# Patient Record
Sex: MALE | Race: WHITE | NOT HISPANIC OR LATINO | URBAN - METROPOLITAN AREA
[De-identification: names, ages, dates, MRNs, and addresses within clinical notes are randomized per-mention and may not be internally consistent; named-entity substitution may affect disease eponyms.]

---

## 2017-06-20 PROBLEM — Z00.00 ENCOUNTER FOR PREVENTIVE HEALTH EXAMINATION: Status: ACTIVE | Noted: 2017-06-20

## 2018-03-15 ENCOUNTER — INPATIENT (INPATIENT)
Facility: HOSPITAL | Age: 33
LOS: 1 days | Discharge: HOME | End: 2018-03-17
Attending: SURGERY | Admitting: SURGERY
Payer: COMMERCIAL

## 2018-03-15 VITALS
DIASTOLIC BLOOD PRESSURE: 62 MMHG | TEMPERATURE: 98 F | SYSTOLIC BLOOD PRESSURE: 129 MMHG | RESPIRATION RATE: 16 BRPM | OXYGEN SATURATION: 100 % | HEART RATE: 73 BPM

## 2018-03-15 DIAGNOSIS — K81.9 CHOLECYSTITIS, UNSPECIFIED: ICD-10-CM

## 2018-03-15 LAB
ALBUMIN SERPL ELPH-MCNC: 4.7 G/DL — SIGNIFICANT CHANGE UP (ref 3.3–5)
ALP SERPL-CCNC: 75 U/L — SIGNIFICANT CHANGE UP (ref 40–120)
ALT FLD-CCNC: 17 U/L — SIGNIFICANT CHANGE UP (ref 4–41)
APPEARANCE UR: SIGNIFICANT CHANGE UP
APTT BLD: 21.9 SEC — LOW (ref 27.5–37.4)
AST SERPL-CCNC: 19 U/L — SIGNIFICANT CHANGE UP (ref 4–40)
BASE EXCESS BLDV CALC-SCNC: -2.9 MMOL/L — SIGNIFICANT CHANGE UP
BASE EXCESS BLDV CALC-SCNC: 0.1 MMOL/L — SIGNIFICANT CHANGE UP
BASOPHILS # BLD AUTO: 0.05 K/UL — SIGNIFICANT CHANGE UP (ref 0–0.2)
BASOPHILS NFR BLD AUTO: 0.5 % — SIGNIFICANT CHANGE UP (ref 0–2)
BILIRUB SERPL-MCNC: 0.4 MG/DL — SIGNIFICANT CHANGE UP (ref 0.2–1.2)
BILIRUB UR-MCNC: NEGATIVE — SIGNIFICANT CHANGE UP
BLD GP AB SCN SERPL QL: NEGATIVE — SIGNIFICANT CHANGE UP
BLOOD GAS VENOUS - CREATININE: 0.78 MG/DL — SIGNIFICANT CHANGE UP (ref 0.5–1.3)
BLOOD GAS VENOUS - CREATININE: 0.82 MG/DL — SIGNIFICANT CHANGE UP (ref 0.5–1.3)
BLOOD UR QL VISUAL: HIGH
BUN SERPL-MCNC: 19 MG/DL — SIGNIFICANT CHANGE UP (ref 7–23)
CALCIUM SERPL-MCNC: 9.6 MG/DL — SIGNIFICANT CHANGE UP (ref 8.4–10.5)
CHLORIDE BLDV-SCNC: 109 MMOL/L — HIGH (ref 96–108)
CHLORIDE BLDV-SCNC: 111 MMOL/L — HIGH (ref 96–108)
CHLORIDE SERPL-SCNC: 103 MMOL/L — SIGNIFICANT CHANGE UP (ref 98–107)
CO2 SERPL-SCNC: 19 MMOL/L — LOW (ref 22–31)
COLOR SPEC: YELLOW — SIGNIFICANT CHANGE UP
CREAT SERPL-MCNC: 0.9 MG/DL — SIGNIFICANT CHANGE UP (ref 0.5–1.3)
EOSINOPHIL # BLD AUTO: 0.16 K/UL — SIGNIFICANT CHANGE UP (ref 0–0.5)
EOSINOPHIL NFR BLD AUTO: 1.6 % — SIGNIFICANT CHANGE UP (ref 0–6)
GAS PNL BLDV: 136 MMOL/L — SIGNIFICANT CHANGE UP (ref 136–146)
GAS PNL BLDV: 137 MMOL/L — SIGNIFICANT CHANGE UP (ref 136–146)
GLUCOSE BLDV-MCNC: 104 — HIGH (ref 70–99)
GLUCOSE BLDV-MCNC: 144 — HIGH (ref 70–99)
GLUCOSE SERPL-MCNC: 136 MG/DL — HIGH (ref 70–99)
GLUCOSE UR-MCNC: NEGATIVE — SIGNIFICANT CHANGE UP
HCO3 BLDV-SCNC: 22 MMOL/L — SIGNIFICANT CHANGE UP (ref 20–27)
HCO3 BLDV-SCNC: 25 MMOL/L — SIGNIFICANT CHANGE UP (ref 20–27)
HCT VFR BLD CALC: 41.9 % — SIGNIFICANT CHANGE UP (ref 39–50)
HCT VFR BLDV CALC: 40.2 % — SIGNIFICANT CHANGE UP (ref 39–51)
HCT VFR BLDV CALC: 45.2 % — SIGNIFICANT CHANGE UP (ref 39–51)
HGB BLD-MCNC: 14.8 G/DL — SIGNIFICANT CHANGE UP (ref 13–17)
HGB BLDV-MCNC: 13.1 G/DL — SIGNIFICANT CHANGE UP (ref 13–17)
HGB BLDV-MCNC: 14.8 G/DL — SIGNIFICANT CHANGE UP (ref 13–17)
IMM GRANULOCYTES # BLD AUTO: 0.04 # — SIGNIFICANT CHANGE UP
IMM GRANULOCYTES NFR BLD AUTO: 0.4 % — SIGNIFICANT CHANGE UP (ref 0–1.5)
INR BLD: 1.33 — HIGH (ref 0.88–1.17)
KETONES UR-MCNC: SIGNIFICANT CHANGE UP
LACTATE BLDV-MCNC: 1.4 MMOL/L — SIGNIFICANT CHANGE UP (ref 0.5–2)
LACTATE BLDV-MCNC: 4.1 MMOL/L — CRITICAL HIGH (ref 0.5–2)
LEUKOCYTE ESTERASE UR-ACNC: NEGATIVE — SIGNIFICANT CHANGE UP
LIDOCAIN IGE QN: 27 U/L — SIGNIFICANT CHANGE UP (ref 7–60)
LYMPHOCYTES # BLD AUTO: 2.19 K/UL — SIGNIFICANT CHANGE UP (ref 1–3.3)
LYMPHOCYTES # BLD AUTO: 21.9 % — SIGNIFICANT CHANGE UP (ref 13–44)
MCHC RBC-ENTMCNC: 31.1 PG — SIGNIFICANT CHANGE UP (ref 27–34)
MCHC RBC-ENTMCNC: 35.3 % — SIGNIFICANT CHANGE UP (ref 32–36)
MCV RBC AUTO: 88 FL — SIGNIFICANT CHANGE UP (ref 80–100)
MONOCYTES # BLD AUTO: 0.84 K/UL — SIGNIFICANT CHANGE UP (ref 0–0.9)
MONOCYTES NFR BLD AUTO: 8.4 % — SIGNIFICANT CHANGE UP (ref 2–14)
MUCOUS THREADS # UR AUTO: SIGNIFICANT CHANGE UP
NEUTROPHILS # BLD AUTO: 6.74 K/UL — SIGNIFICANT CHANGE UP (ref 1.8–7.4)
NEUTROPHILS NFR BLD AUTO: 67.2 % — SIGNIFICANT CHANGE UP (ref 43–77)
NITRITE UR-MCNC: NEGATIVE — SIGNIFICANT CHANGE UP
NRBC # FLD: 0 — SIGNIFICANT CHANGE UP
PCO2 BLDV: 25 MMHG — LOW (ref 41–51)
PCO2 BLDV: 40 MMHG — LOW (ref 41–51)
PH BLDV: 7.36 PH — SIGNIFICANT CHANGE UP (ref 7.32–7.43)
PH BLDV: 7.55 PH — HIGH (ref 7.32–7.43)
PH UR: 6 — SIGNIFICANT CHANGE UP (ref 4.6–8)
PLATELET # BLD AUTO: 247 K/UL — SIGNIFICANT CHANGE UP (ref 150–400)
PMV BLD: 9.8 FL — SIGNIFICANT CHANGE UP (ref 7–13)
PO2 BLDV: 107 MMHG — HIGH (ref 35–40)
PO2 BLDV: 28 MMHG — LOW (ref 35–40)
POTASSIUM BLDV-SCNC: 3.3 MMOL/L — LOW (ref 3.4–4.5)
POTASSIUM BLDV-SCNC: 3.4 MMOL/L — SIGNIFICANT CHANGE UP (ref 3.4–4.5)
POTASSIUM SERPL-MCNC: 3.7 MMOL/L — SIGNIFICANT CHANGE UP (ref 3.5–5.3)
POTASSIUM SERPL-SCNC: 3.7 MMOL/L — SIGNIFICANT CHANGE UP (ref 3.5–5.3)
PROT SERPL-MCNC: 7.3 G/DL — SIGNIFICANT CHANGE UP (ref 6–8.3)
PROT UR-MCNC: 30 MG/DL — HIGH
PROTHROM AB SERPL-ACNC: 15.4 SEC — HIGH (ref 9.8–13.1)
RBC # BLD: 4.76 M/UL — SIGNIFICANT CHANGE UP (ref 4.2–5.8)
RBC # FLD: 11.9 % — SIGNIFICANT CHANGE UP (ref 10.3–14.5)
RBC CASTS # UR COMP ASSIST: >50 — HIGH (ref 0–?)
RH IG SCN BLD-IMP: POSITIVE — SIGNIFICANT CHANGE UP
RH IG SCN BLD-IMP: POSITIVE — SIGNIFICANT CHANGE UP
SAO2 % BLDV: 61.5 % — SIGNIFICANT CHANGE UP (ref 60–85)
SAO2 % BLDV: 97.9 % — HIGH (ref 60–85)
SODIUM SERPL-SCNC: 142 MMOL/L — SIGNIFICANT CHANGE UP (ref 135–145)
SP GR SPEC: 1.04 — SIGNIFICANT CHANGE UP (ref 1–1.04)
UROBILINOGEN FLD QL: NORMAL MG/DL — SIGNIFICANT CHANGE UP
WBC # BLD: 10.02 K/UL — SIGNIFICANT CHANGE UP (ref 3.8–10.5)
WBC # FLD AUTO: 10.02 K/UL — SIGNIFICANT CHANGE UP (ref 3.8–10.5)
WBC UR QL: SIGNIFICANT CHANGE UP (ref 0–?)

## 2018-03-15 PROCEDURE — 74177 CT ABD & PELVIS W/CONTRAST: CPT | Mod: 26

## 2018-03-15 PROCEDURE — 99221 1ST HOSP IP/OBS SF/LOW 40: CPT | Mod: 57

## 2018-03-15 PROCEDURE — 76705 ECHO EXAM OF ABDOMEN: CPT | Mod: 26

## 2018-03-15 RX ORDER — SODIUM CHLORIDE 9 MG/ML
1000 INJECTION INTRAMUSCULAR; INTRAVENOUS; SUBCUTANEOUS ONCE
Qty: 0 | Refills: 0 | Status: COMPLETED | OUTPATIENT
Start: 2018-03-15 | End: 2018-03-15

## 2018-03-15 RX ORDER — CEFOTETAN DISODIUM 1 G
1 VIAL (EA) INJECTION ONCE
Qty: 0 | Refills: 0 | Status: COMPLETED | OUTPATIENT
Start: 2018-03-15 | End: 2018-03-15

## 2018-03-15 RX ORDER — CEFOTETAN DISODIUM 1 G
VIAL (EA) INJECTION
Qty: 0 | Refills: 0 | Status: DISCONTINUED | OUTPATIENT
Start: 2018-03-15 | End: 2018-03-16

## 2018-03-15 RX ORDER — ONDANSETRON 8 MG/1
4 TABLET, FILM COATED ORAL ONCE
Qty: 0 | Refills: 0 | Status: COMPLETED | OUTPATIENT
Start: 2018-03-15 | End: 2018-03-15

## 2018-03-15 RX ORDER — HEPARIN SODIUM 5000 [USP'U]/ML
5000 INJECTION INTRAVENOUS; SUBCUTANEOUS EVERY 8 HOURS
Qty: 0 | Refills: 0 | Status: DISCONTINUED | OUTPATIENT
Start: 2018-03-15 | End: 2018-03-17

## 2018-03-15 RX ORDER — ACETAMINOPHEN 500 MG
1000 TABLET ORAL ONCE
Qty: 0 | Refills: 0 | Status: COMPLETED | OUTPATIENT
Start: 2018-03-15 | End: 2018-03-15

## 2018-03-15 RX ORDER — MORPHINE SULFATE 50 MG/1
6 CAPSULE, EXTENDED RELEASE ORAL ONCE
Qty: 0 | Refills: 0 | Status: DISCONTINUED | OUTPATIENT
Start: 2018-03-15 | End: 2018-03-15

## 2018-03-15 RX ORDER — PIPERACILLIN AND TAZOBACTAM 4; .5 G/20ML; G/20ML
3.38 INJECTION, POWDER, LYOPHILIZED, FOR SOLUTION INTRAVENOUS ONCE
Qty: 0 | Refills: 0 | Status: COMPLETED | OUTPATIENT
Start: 2018-03-15 | End: 2018-03-15

## 2018-03-15 RX ORDER — METOCLOPRAMIDE HCL 10 MG
10 TABLET ORAL ONCE
Qty: 0 | Refills: 0 | Status: COMPLETED | OUTPATIENT
Start: 2018-03-15 | End: 2018-03-15

## 2018-03-15 RX ORDER — MORPHINE SULFATE 50 MG/1
4 CAPSULE, EXTENDED RELEASE ORAL ONCE
Qty: 0 | Refills: 0 | Status: DISCONTINUED | OUTPATIENT
Start: 2018-03-15 | End: 2018-03-15

## 2018-03-15 RX ORDER — HYDROMORPHONE HYDROCHLORIDE 2 MG/ML
1 INJECTION INTRAMUSCULAR; INTRAVENOUS; SUBCUTANEOUS EVERY 4 HOURS
Qty: 0 | Refills: 0 | Status: DISCONTINUED | OUTPATIENT
Start: 2018-03-15 | End: 2018-03-16

## 2018-03-15 RX ORDER — INFLUENZA VIRUS VACCINE 15; 15; 15; 15 UG/.5ML; UG/.5ML; UG/.5ML; UG/.5ML
0.5 SUSPENSION INTRAMUSCULAR ONCE
Qty: 0 | Refills: 0 | Status: DISCONTINUED | OUTPATIENT
Start: 2018-03-15 | End: 2018-03-17

## 2018-03-15 RX ORDER — CEFOTETAN DISODIUM 1 G
1 VIAL (EA) INJECTION EVERY 12 HOURS
Qty: 0 | Refills: 0 | Status: DISCONTINUED | OUTPATIENT
Start: 2018-03-15 | End: 2018-03-16

## 2018-03-15 RX ORDER — OXYCODONE AND ACETAMINOPHEN 5; 325 MG/1; MG/1
1 TABLET ORAL EVERY 4 HOURS
Qty: 0 | Refills: 0 | Status: DISCONTINUED | OUTPATIENT
Start: 2018-03-15 | End: 2018-03-17

## 2018-03-15 RX ORDER — TAMSULOSIN HYDROCHLORIDE 0.4 MG/1
0.4 CAPSULE ORAL AT BEDTIME
Qty: 0 | Refills: 0 | Status: DISCONTINUED | OUTPATIENT
Start: 2018-03-15 | End: 2018-03-17

## 2018-03-15 RX ORDER — SODIUM CHLORIDE 9 MG/ML
1000 INJECTION, SOLUTION INTRAVENOUS ONCE
Qty: 0 | Refills: 0 | Status: COMPLETED | OUTPATIENT
Start: 2018-03-15 | End: 2018-03-15

## 2018-03-15 RX ORDER — SODIUM CHLORIDE 9 MG/ML
1000 INJECTION, SOLUTION INTRAVENOUS
Qty: 0 | Refills: 0 | Status: DISCONTINUED | OUTPATIENT
Start: 2018-03-15 | End: 2018-03-16

## 2018-03-15 RX ADMIN — MORPHINE SULFATE 6 MILLIGRAM(S): 50 CAPSULE, EXTENDED RELEASE ORAL at 02:59

## 2018-03-15 RX ADMIN — HYDROMORPHONE HYDROCHLORIDE 1 MILLIGRAM(S): 2 INJECTION INTRAMUSCULAR; INTRAVENOUS; SUBCUTANEOUS at 12:30

## 2018-03-15 RX ADMIN — SODIUM CHLORIDE 125 MILLILITER(S): 9 INJECTION, SOLUTION INTRAVENOUS at 12:16

## 2018-03-15 RX ADMIN — Medication 1000 MILLIGRAM(S): at 04:50

## 2018-03-15 RX ADMIN — ONDANSETRON 4 MILLIGRAM(S): 8 TABLET, FILM COATED ORAL at 02:59

## 2018-03-15 RX ADMIN — Medication 400 MILLIGRAM(S): at 04:27

## 2018-03-15 RX ADMIN — MORPHINE SULFATE 4 MILLIGRAM(S): 50 CAPSULE, EXTENDED RELEASE ORAL at 10:04

## 2018-03-15 RX ADMIN — HEPARIN SODIUM 5000 UNIT(S): 5000 INJECTION INTRAVENOUS; SUBCUTANEOUS at 22:39

## 2018-03-15 RX ADMIN — MORPHINE SULFATE 6 MILLIGRAM(S): 50 CAPSULE, EXTENDED RELEASE ORAL at 03:15

## 2018-03-15 RX ADMIN — MORPHINE SULFATE 4 MILLIGRAM(S): 50 CAPSULE, EXTENDED RELEASE ORAL at 09:40

## 2018-03-15 RX ADMIN — PIPERACILLIN AND TAZOBACTAM 200 GRAM(S): 4; .5 INJECTION, POWDER, LYOPHILIZED, FOR SOLUTION INTRAVENOUS at 09:55

## 2018-03-15 RX ADMIN — Medication 100 GRAM(S): at 14:26

## 2018-03-15 RX ADMIN — SODIUM CHLORIDE 3000 MILLILITER(S): 9 INJECTION INTRAMUSCULAR; INTRAVENOUS; SUBCUTANEOUS at 06:59

## 2018-03-15 RX ADMIN — ONDANSETRON 4 MILLIGRAM(S): 8 TABLET, FILM COATED ORAL at 06:59

## 2018-03-15 RX ADMIN — HYDROMORPHONE HYDROCHLORIDE 1 MILLIGRAM(S): 2 INJECTION INTRAMUSCULAR; INTRAVENOUS; SUBCUTANEOUS at 12:15

## 2018-03-15 RX ADMIN — HYDROMORPHONE HYDROCHLORIDE 1 MILLIGRAM(S): 2 INJECTION INTRAMUSCULAR; INTRAVENOUS; SUBCUTANEOUS at 19:45

## 2018-03-15 RX ADMIN — TAMSULOSIN HYDROCHLORIDE 0.4 MILLIGRAM(S): 0.4 CAPSULE ORAL at 22:39

## 2018-03-15 RX ADMIN — SODIUM CHLORIDE 2000 MILLILITER(S): 9 INJECTION, SOLUTION INTRAVENOUS at 10:35

## 2018-03-15 RX ADMIN — HYDROMORPHONE HYDROCHLORIDE 1 MILLIGRAM(S): 2 INJECTION INTRAMUSCULAR; INTRAVENOUS; SUBCUTANEOUS at 19:33

## 2018-03-15 RX ADMIN — MORPHINE SULFATE 4 MILLIGRAM(S): 50 CAPSULE, EXTENDED RELEASE ORAL at 06:59

## 2018-03-15 RX ADMIN — HEPARIN SODIUM 5000 UNIT(S): 5000 INJECTION INTRAVENOUS; SUBCUTANEOUS at 14:29

## 2018-03-15 RX ADMIN — Medication 10 MILLIGRAM(S): at 09:45

## 2018-03-15 RX ADMIN — SODIUM CHLORIDE 125 MILLILITER(S): 9 INJECTION, SOLUTION INTRAVENOUS at 10:55

## 2018-03-15 RX ADMIN — SODIUM CHLORIDE 1000 MILLILITER(S): 9 INJECTION INTRAMUSCULAR; INTRAVENOUS; SUBCUTANEOUS at 02:59

## 2018-03-15 NOTE — ED PROVIDER NOTE - OBJECTIVE STATEMENT
33 yo man w/ no pmh/psh here w/ abd pain. Developed pain 2 hrs ago, RLQ, constant, severe, associated w/ nausea. Has never had this type of pain before. Has chills. Denies vomiting, dysuria/hematuria, testicular pain.

## 2018-03-15 NOTE — H&P ADULT - NSHPLABSRESULTS_GEN_ALL_CORE
14.8   10.02 )-----------( 247      ( 15 Mar 2018 02:40 )             41.9   03-15    142  |  103  |  19  ----------------------------<  136<H>  3.7   |  19<L>  |  0.90    Ca    9.6      15 Mar 2018 02:40    TPro  7.3  /  Alb  4.7  /  TBili  0.4  /  DBili  x   /  AST  19  /  ALT  17  /  AlkPhos  75  03-15    < from: US Abdomen Limited (03.15.18 @ 08:50) >    INTERPRETATION:  CLINICAL INFORMATION: Gallbladder wall thickening.    COMPARISON: CT from earlier the same day.    TECHNIQUE: Sonography of the right upper quadrant.     FINDINGS:    Liver: Within normal limits.    Bile ducts: Normal caliber. Common hepatic duct measures 2 mm.     Gallbladder: Distended with gallstones. Mildly thickened gallbladder wall   to 0.4 cm. Positive sonographic Escobedo sign.        Pancreas: Visualized portions are within normal limits.    Right kidney: 12.7 cm. Mild right hydronephrosis. Small amount of right   perinephric fluid.    Ascites: None.    IVC: Visualized portions are within normal limits.    IMPRESSION:     Suggestive of acute cholecystitis.    Mild right hydronephrosis.    < end of copied text >    < from: CT Abdomen and Pelvis w/ Oral Cont and w/ IV Cont (03.15.18 @ 06:12) >    PROCEDURE:   CT of the Abdomen and Pelvis was performed with intravenous contrast.   Intravenous contrast: 90 ml Omnipaque 350. 10 ml discarded.  Oral contrast: positive contrast was administered.  Sagittal and coronal reformats were performed.    FINDINGS:    LOWER CHEST: Left basilar atelectasis    LIVER: Periportal edema which may be related to hydration.  GALLBLADDER: Suggestion of mild gallbladder wall edema/thickening. Small   focus of gas within the gallbladder lumen likely related to underlying   gallstones.  SPLEEN: Within normal limits.  PANCREAS: Within normal limits.  ADRENALS: Within normal limits.  KIDNEYS/URETERS: Mild right hydroureteronephrosis secondary to 2 mm   obstructing stone along the mid to distal right ureter, series 2 image   84. There is a delayed right nephrogram and trace right retroperitoneal   fluid adjacent to the proximal right ureter. No hydronephrosis of the   left kidney.    BLADDER: Within normal limits.  REPRODUCTIVE ORGANS: Unremarkable prostate gland.    BOWEL: No bowel obstruction. Limited evaluation of the colon due to   underdistention. Appendix normal.  PERITONEUM: No ascites. Tiny mesenteric nodes.  VESSELS:  Retroaortic left renal vein.  RETROPERITONEUM: No lymphadenopathy.    ABDOMINAL WALL: Within normal limits.  BONES: Within normal limits.    IMPRESSION:     Mild right hydroureteronephrosis and perinephric stranding secondary to   an obstructing 2 mm stone along the mid to distal right ureter.    Normal appendix.    Suggestion of mild gallbladder wall thickening. Probable gallstones.   Correlate with ultrasound and symptoms.    < end of copied text >

## 2018-03-15 NOTE — ED ADULT NURSE NOTE - OBJECTIVE STATEMENT
Break Coverage RN: The patient is a 31 y/o male a&ox4 p/w a c/c of RLQ that began acutely 45 minutes ago waking him up from his sleep.  The patient denies any palliating or provoking factors, denies radiation of pain.  Endorses nasseau denies any episodes of vomiting, endorses one episode of diarreah.  The patient appears jaundiced in the face, as per collateral from wife patient is has a "yellow tinge," to his skin tone that is not his baseline.  Denies SOB, CP, GI/ symptoms.  VSS, 18 gauge PIV placed in left ac, MD at bedside.

## 2018-03-15 NOTE — ED PROVIDER NOTE - SHIFT CHANGE DETAILS
I have signed over this patient to the above attending physician. Pertinent history, physical exam findings and workup thus far in the ED have been discussed. The pending tests and plan, including RUQ US were signed over.  All questions from the above attending physician have been answered.

## 2018-03-15 NOTE — ED PROVIDER NOTE - GENITOURINARY, MLM
External genitalia is normal. Intact cremasteric. No testicular tenderness. Chaperone: Morteza Rock RN

## 2018-03-15 NOTE — ED PROVIDER NOTE - PROGRESS NOTE DETAILS
Parmjit Schuster MD PGY4: CT report reviewed. Pt w/ ongoing abd pain. This is most likely 2/2 renal colic; however, will obtain RUQ sono to r/o cholecystitis. Pt improved after meds,  surgery accepted pt to their service.   to see pt as well regarding renal stones

## 2018-03-15 NOTE — PRE-OP CHECKLIST - SELECT TESTS ORDERED
BMP/CBC/PT/PTT/INR/Type and Screen/Urinalysis BMP/Type and Cross/Type and Screen/Urinalysis/CBC/PT/PTT/INR

## 2018-03-15 NOTE — CONSULT NOTE ADULT - SUBJECTIVE AND OBJECTIVE BOX
HPI:  Patient is a 32y Male who presented with right sided flank and abdominal pain which began last night with no inciting incident. Patient states pain stabbing, aching which has no improved. Patient underwent CT and U/S evaluation in the ED which demonstrated a 3-4 mm right ureteral stone with moderate hydronephrosis. U/s at this time also demonstrated cholecystitis for which patient is booked for cholecystectomy. Patient denies fever, hematuria, dysuria, or prior kidney stones.         PAST MEDICAL & SURGICAL HISTORY:  No pertinent past medical history  No significant past surgical history    MEDICATIONS  (STANDING):  cefoTEtan  IVPB      cefoTEtan  IVPB 1 Gram(s) IV Intermittent once  cefoTEtan  IVPB 1 Gram(s) IV Intermittent every 12 hours  heparin  Injectable 5000 Unit(s) SubCutaneous every 8 hours  influenza   Vaccine 0.5 milliLiter(s) IntraMuscular once  lactated ringers. 1000 milliLiter(s) (125 mL/Hr) IV Continuous <Continuous>    MEDICATIONS  (PRN):  HYDROmorphone  Injectable 1 milliGRAM(s) IV Push every 4 hours PRN Severe Pain (7 - 10)  oxyCODONE    5 mG/acetaminophen 325 mG 1 Tablet(s) Oral every 4 hours PRN Moderate Pain    FAMILY HISTORY:  No pertinent family history in first degree relatives    Allergies    No Known Allergies    Intolerances      SOCIAL HISTORY:   Tobacco hx:    REVIEW OF SYSTEMS: Pertinent positives and negatives as stated in HPI, otherwise negative    Vital signs  T(C): 37, Max: 37.3 (03-15 @ 06:51)  HR: 87  BP: 119/74  SpO2: 97%    Output    UOP    Physical Exam  Gen: NAD  Pulm: No respiratory distress, no subcostal retractions  CV: RRR, no JVD  Abd: Tender to palpation in RUQ   : no flank tenderness present bilaterally.    LABS:      03-15 @ 02:40    WBC 10.02 / Hct 41.9  / SCr 0.90     03-15    142  |  103  |  19  ----------------------------<  136<H>  3.7   |  19<L>  |  0.90    Ca    9.6      15 Mar 2018 02:40    TPro  7.3  /  Alb  4.7  /  TBili  0.4  /  DBili  x   /  AST  19  /  ALT  17  /  AlkPhos  75  03-15    PT/INR - ( 15 Mar 2018 02:50 )   PT: 15.4 SEC;   INR: 1.33          PTT - ( 15 Mar 2018 02:50 )  PTT:21.9 SEC  Urinalysis Basic - ( 15 Mar 2018 08:59 )    Color: YELLOW / Appearance: HAZY / S.040 / pH: 6.0  Gluc: NEGATIVE / Ketone: SMALL  / Bili: NEGATIVE / Urobili: NORMAL mg/dL   Blood: LARGE / Protein: 30 mg/dL / Nitrite: NEGATIVE   Leuk Esterase: NEGATIVE / RBC: >50 / WBC 10-25   Sq Epi: x / Non Sq Epi: x / Bacteria: x

## 2018-03-15 NOTE — H&P ADULT - ASSESSMENT
32 year old male with acute cholecystitis  -Admit to general surgery under Dr. Montesinos  -Patient is booked and consented for a laparoscopic cholecystectomy, possible open  -NPO, IV fluids, IV antibiotics  -ED has consulted urology for the obstructing stone with mild hydronephronis and blood on UA  -Lactic acid elevated to 4.1; patient to be aggressively hydrated; repeat level  -Pain control  -DVT prophylaxis

## 2018-03-15 NOTE — H&P ADULT - NSHPPHYSICALEXAM_GEN_ALL_CORE
General:  Sitting up in bed, appears comfortable  Chest:  Breath sounds audible bilaterally; no wheezing, rales or ronchi  Abdomen:  Soft, tender to palpation in the right upper quadrant, non distended  Extremities:  Warm, well perfused

## 2018-03-15 NOTE — H&P ADULT - HISTORY OF PRESENT ILLNESS
32 year old male with no past medical history presents to the emergency room complaining of abdominal pain.  Pain started around midnight last night.  Patient describes a sharp and stabbing pain in the right upper quadrant.  No aggravating or relieving factors.  Patient has never had pain like this in the past.  Patient reports nausea but no vomiting.  No report of any fevers, chills, shortness of breath, dysuria, changes in bowel habits.

## 2018-03-15 NOTE — H&P ADULT - ATTENDING COMMENTS
Although has ureteral stone, there are definitive findings on imaging to support a diagnosis of acute cholecystitis.  Antibiotics.  Laparoscopic versus open cholecystectomy pending OR availability.

## 2018-03-15 NOTE — ED PROVIDER NOTE - ATTENDING CONTRIBUTION TO CARE
Gisellejuan pablo: 33 yo male with acute onset RLQ pain two hours prior to presentation. + associated nausea but no vomiting, flank pain, dysuria or hematuria. No testicular pain. Exam; wella ppearing, NAD, MMM, no icterus, +S1/S2, lungs CTA b/l, abdomen is soft with RLQ TTP, no guarding. NO CVA TTP. Testicular exam unremarkable. intact cremasteric. A/P- 33 yo male with RLQ abdominal pain. will obtain labs, CTAP with PO and IV contrast and treat symptomatically.

## 2018-03-15 NOTE — ED ADULT TRIAGE NOTE - CHIEF COMPLAINT QUOTE
Pt arrives very pale, appears very uncomfortable. Pt st" I think my appendic burst....sudden sharp pain in rt side of abd. " Denies vomiting + diarreah

## 2018-03-15 NOTE — CONSULT NOTE ADULT - ASSESSMENT
Pt is a 33 yo male with cholecystitis and right 3-4 mm obstructing mid-ureteral stone. Patient is going to OR for cholecystectomy.  - Flomax  - Urine culture.  - Strain urine.  - Hydrate either IVF or oral when allowed.  - Follow up outpatient at [phone]

## 2018-03-16 ENCOUNTER — TRANSCRIPTION ENCOUNTER (OUTPATIENT)
Age: 33
End: 2018-03-16

## 2018-03-16 ENCOUNTER — RESULT REVIEW (OUTPATIENT)
Age: 33
End: 2018-03-16

## 2018-03-16 LAB
ALBUMIN SERPL ELPH-MCNC: 3.9 G/DL — SIGNIFICANT CHANGE UP (ref 3.3–5)
ALP SERPL-CCNC: 64 U/L — SIGNIFICANT CHANGE UP (ref 40–120)
ALT FLD-CCNC: 15 U/L — SIGNIFICANT CHANGE UP (ref 4–41)
APTT BLD: 28.7 SEC — SIGNIFICANT CHANGE UP (ref 27.5–37.4)
AST SERPL-CCNC: 18 U/L — SIGNIFICANT CHANGE UP (ref 4–40)
BILIRUB SERPL-MCNC: 0.7 MG/DL — SIGNIFICANT CHANGE UP (ref 0.2–1.2)
BLD GP AB SCN SERPL QL: NEGATIVE — SIGNIFICANT CHANGE UP
BUN SERPL-MCNC: 9 MG/DL — SIGNIFICANT CHANGE UP (ref 7–23)
CALCIUM SERPL-MCNC: 8.5 MG/DL — SIGNIFICANT CHANGE UP (ref 8.4–10.5)
CHLORIDE SERPL-SCNC: 100 MMOL/L — SIGNIFICANT CHANGE UP (ref 98–107)
CO2 SERPL-SCNC: 23 MMOL/L — SIGNIFICANT CHANGE UP (ref 22–31)
CREAT SERPL-MCNC: 1.2 MG/DL — SIGNIFICANT CHANGE UP (ref 0.5–1.3)
GLUCOSE SERPL-MCNC: 103 MG/DL — HIGH (ref 70–99)
HCT VFR BLD CALC: 41.9 % — SIGNIFICANT CHANGE UP (ref 39–50)
HGB BLD-MCNC: 14.6 G/DL — SIGNIFICANT CHANGE UP (ref 13–17)
INR BLD: 1.52 — HIGH (ref 0.88–1.17)
MAGNESIUM SERPL-MCNC: 1.6 MG/DL — SIGNIFICANT CHANGE UP (ref 1.6–2.6)
MCHC RBC-ENTMCNC: 30.8 PG — SIGNIFICANT CHANGE UP (ref 27–34)
MCHC RBC-ENTMCNC: 34.8 % — SIGNIFICANT CHANGE UP (ref 32–36)
MCV RBC AUTO: 88.4 FL — SIGNIFICANT CHANGE UP (ref 80–100)
NRBC # FLD: 0 — SIGNIFICANT CHANGE UP
PHOSPHATE SERPL-MCNC: 2.5 MG/DL — SIGNIFICANT CHANGE UP (ref 2.5–4.5)
PLATELET # BLD AUTO: 205 K/UL — SIGNIFICANT CHANGE UP (ref 150–400)
PMV BLD: 10.3 FL — SIGNIFICANT CHANGE UP (ref 7–13)
POTASSIUM SERPL-MCNC: 3.8 MMOL/L — SIGNIFICANT CHANGE UP (ref 3.5–5.3)
POTASSIUM SERPL-SCNC: 3.8 MMOL/L — SIGNIFICANT CHANGE UP (ref 3.5–5.3)
PROT SERPL-MCNC: 6.4 G/DL — SIGNIFICANT CHANGE UP (ref 6–8.3)
PROTHROM AB SERPL-ACNC: 17 SEC — HIGH (ref 9.8–13.1)
RBC # BLD: 4.74 M/UL — SIGNIFICANT CHANGE UP (ref 4.2–5.8)
RBC # FLD: 12 % — SIGNIFICANT CHANGE UP (ref 10.3–14.5)
RH IG SCN BLD-IMP: POSITIVE — SIGNIFICANT CHANGE UP
SODIUM SERPL-SCNC: 138 MMOL/L — SIGNIFICANT CHANGE UP (ref 135–145)
SPECIMEN SOURCE: SIGNIFICANT CHANGE UP
SPECIMEN SOURCE: SIGNIFICANT CHANGE UP
WBC # BLD: 12.33 K/UL — HIGH (ref 3.8–10.5)
WBC # FLD AUTO: 12.33 K/UL — HIGH (ref 3.8–10.5)

## 2018-03-16 PROCEDURE — 47562 LAPAROSCOPIC CHOLECYSTECTOMY: CPT | Mod: GC

## 2018-03-16 PROCEDURE — 88304 TISSUE EXAM BY PATHOLOGIST: CPT | Mod: 26

## 2018-03-16 RX ORDER — HYDROMORPHONE HYDROCHLORIDE 2 MG/ML
1 INJECTION INTRAMUSCULAR; INTRAVENOUS; SUBCUTANEOUS ONCE
Qty: 0 | Refills: 0 | Status: DISCONTINUED | OUTPATIENT
Start: 2018-03-16 | End: 2018-03-16

## 2018-03-16 RX ORDER — OXYCODONE AND ACETAMINOPHEN 5; 325 MG/1; MG/1
2 TABLET ORAL EVERY 4 HOURS
Qty: 0 | Refills: 0 | Status: DISCONTINUED | OUTPATIENT
Start: 2018-03-16 | End: 2018-03-17

## 2018-03-16 RX ORDER — TAMSULOSIN HYDROCHLORIDE 0.4 MG/1
1 CAPSULE ORAL
Qty: 21 | Refills: 0 | OUTPATIENT
Start: 2018-03-16 | End: 2018-04-05

## 2018-03-16 RX ORDER — MAGNESIUM SULFATE 500 MG/ML
2 VIAL (ML) INJECTION ONCE
Qty: 0 | Refills: 0 | Status: COMPLETED | OUTPATIENT
Start: 2018-03-16 | End: 2018-03-16

## 2018-03-16 RX ORDER — KETOROLAC TROMETHAMINE 30 MG/ML
30 SYRINGE (ML) INJECTION EVERY 6 HOURS
Qty: 0 | Refills: 0 | Status: DISCONTINUED | OUTPATIENT
Start: 2018-03-16 | End: 2018-03-17

## 2018-03-16 RX ORDER — POTASSIUM PHOSPHATE, MONOBASIC POTASSIUM PHOSPHATE, DIBASIC 236; 224 MG/ML; MG/ML
15 INJECTION, SOLUTION INTRAVENOUS ONCE
Qty: 0 | Refills: 0 | Status: COMPLETED | OUTPATIENT
Start: 2018-03-16 | End: 2018-03-16

## 2018-03-16 RX ADMIN — OXYCODONE AND ACETAMINOPHEN 1 TABLET(S): 5; 325 TABLET ORAL at 01:00

## 2018-03-16 RX ADMIN — HEPARIN SODIUM 5000 UNIT(S): 5000 INJECTION INTRAVENOUS; SUBCUTANEOUS at 21:48

## 2018-03-16 RX ADMIN — Medication 30 MILLIGRAM(S): at 22:18

## 2018-03-16 RX ADMIN — OXYCODONE AND ACETAMINOPHEN 1 TABLET(S): 5; 325 TABLET ORAL at 01:30

## 2018-03-16 RX ADMIN — Medication 30 MILLIGRAM(S): at 15:10

## 2018-03-16 RX ADMIN — Medication 30 MILLIGRAM(S): at 21:48

## 2018-03-16 RX ADMIN — POTASSIUM PHOSPHATE, MONOBASIC POTASSIUM PHOSPHATE, DIBASIC 62.5 MILLIMOLE(S): 236; 224 INJECTION, SOLUTION INTRAVENOUS at 13:57

## 2018-03-16 RX ADMIN — HYDROMORPHONE HYDROCHLORIDE 1 MILLIGRAM(S): 2 INJECTION INTRAMUSCULAR; INTRAVENOUS; SUBCUTANEOUS at 08:54

## 2018-03-16 RX ADMIN — HEPARIN SODIUM 5000 UNIT(S): 5000 INJECTION INTRAVENOUS; SUBCUTANEOUS at 14:39

## 2018-03-16 RX ADMIN — HEPARIN SODIUM 5000 UNIT(S): 5000 INJECTION INTRAVENOUS; SUBCUTANEOUS at 05:40

## 2018-03-16 RX ADMIN — Medication 50 GRAM(S): at 13:57

## 2018-03-16 RX ADMIN — Medication 100 GRAM(S): at 01:10

## 2018-03-16 RX ADMIN — Medication 30 MILLIGRAM(S): at 14:39

## 2018-03-16 RX ADMIN — TAMSULOSIN HYDROCHLORIDE 0.4 MILLIGRAM(S): 0.4 CAPSULE ORAL at 21:49

## 2018-03-16 NOTE — CHART NOTE - NSCHARTNOTEFT_GEN_A_CORE
Post-Operative Note    S: Patient underwent lap cholecystectomy and tolerated procedure without  issue and sent to PACU and then to floor.  Patient denies chest pain, shortness of breath, nausea, vomiting, lightheadedness, or dizziness. Patient denies pain. Has voided spontaneously without dysuria.     O:T(C): 37.2 (03-16-18 @ 13:55), Max: 37.8 (03-16-18 @ 08:09)  HR: 77 (03-16-18 @ 13:55) (77 - 100)  BP: 121/76 (03-16-18 @ 13:55) (109/60 - 133/75)  RR: 18 (03-16-18 @ 13:55) (13 - 20)  SpO2: 96% (03-16-18 @ 13:55) (95% - 98%)  Wt(kg): --                        14.6   12.33 )-----------( 205      ( 16 Mar 2018 06:30 )             41.9        03-16    138  |  100  |  9   ----------------------------<  103<H>  3.8   |  23  |  1.20    Ca    8.5      16 Mar 2018 06:30  Phos  2.5     03-16  Mg     1.6     03-16      Gen: NAD  Resp: Nonlabored, no increased WOB  Abd: Soft, nontender. Mildly distended. Incisions dressed, c/d/i        Assessment/Plan:  32y Male presented with cholecystitis and R kidney stone, now s/p Laparoscopic cholecystectomy    - Pain control  - Regular diet  - Continue Flomax per Urology  - DVT ppx

## 2018-03-16 NOTE — DISCHARGE NOTE ADULT - MEDICATION SUMMARY - MEDICATIONS TO TAKE
I will START or STAY ON the medications listed below when I get home from the hospital:    oxyCODONE-acetaminophen 5 mg-325 mg oral tablet  -- 1 tab(s) by mouth every 4 hours, As Needed -Moderate Pain - for severe pain MDD:6 tabs   -- Indication: For Moderate-  Severe pain    tamsulosin 0.4 mg oral capsule  -- 1 cap(s) by mouth once a day (at bedtime)  -- Indication: For Kidney stone

## 2018-03-16 NOTE — PROGRESS NOTE ADULT - SUBJECTIVE AND OBJECTIVE BOX
Subjective C/O right flank to back pain. No fevers    Objective    Vital signs  T(F): , Max: 99.8 (03-15-18 @ 22:11)  HR: 86 (03-16-18 @ 05:38)  BP: 125/82 (03-16-18 @ 05:38)  SpO2: 97% (03-16-18 @ 05:38)  Wt(kg): --    Output     03-15 @ 07:01  -  03-16 @ 07:00  --------------------------------------------------------  IN: 2500 mL / OUT: 1500 mL / NET: 1000 mL        Gen NAD  Abd RUQ and R flank-> CVA tenderness    Labs      03-16 @ 06:30    WBC 12.33 / Hct 41.9  / SCr --       03-15 @ 02:40    WBC 10.02 / Hct 41.9  / SCr 0.90       Urine Cx: p

## 2018-03-16 NOTE — DISCHARGE NOTE ADULT - INSTRUCTIONS
Continue regular diet Watch for signs of infection; redness, swelling, fever, chills or heat, report such symptoms to the MD. No driving while taking pain medication, it causes drowsiness & constipation. Drink 6-8 glasses of fluids daily to promote hydration. No heavy lifting, pulling or pushing heavy objects. Follow up with the MD

## 2018-03-16 NOTE — DISCHARGE NOTE ADULT - CARE PROVIDER_API CALL
Radha Adame), Surgery  [address] [place] [address]  Phone: [phone]  Fax: [phone]

## 2018-03-16 NOTE — PROGRESS NOTE ADULT - ASSESSMENT
Pt is a 33 yo male with cholecystitis and right 3-4 mm obstructing mid-ureteral stone. Patient is going to OR for cholecystectomy.  - Please add 0.4 mg Flomax  - Urine culture.  - Strain urine.  - Hydrate either IVF or oral when allowed.  - Pain control with percocet or NSAIDs per primary team  - If pt with fevers and likely from urinary source pt will need stent   - Follow up outpatient at [phone]

## 2018-03-16 NOTE — PROGRESS NOTE ADULT - ASSESSMENT
32 year old male with acute cholecystitis and R ureteral stone, currently hemodynamically stable    - Plan for OR today for lap cholecystectomy  - Continue NPO, IV fluids, IV antibiotics  - Flomax, stain urine, urine cx pending per Urology, appreciate reccs  - Pain control  - DVT prophylaxis

## 2018-03-16 NOTE — DISCHARGE NOTE ADULT - PLAN OF CARE
Laparoscopic cholecystectomy Resume regular diet at home  Please follow up with Dr. Adame in 7-10 days  Do not remove steri strips. They will fall off on their own.  After showering, please pat steri strips dry. After surgery, some blood may escape from under the tape. The paper tape may fall off after several days. If not, they will be removed in the office. Pain control Continue taking Flomax daily at home.  Drink plenty of fluids.    Follow up within 1 week of discharge (You may call [phone] for an appointment)

## 2018-03-16 NOTE — BRIEF OPERATIVE NOTE - PROCEDURE
<<-----Click on this checkbox to enter Procedure Laparoscopic cholecystectomy  03/16/2018    Active  [id number]

## 2018-03-16 NOTE — DISCHARGE NOTE ADULT - PATIENT PORTAL LINK FT
You can access the [facility], offered by [facility], by registering with the following website: [email or web address]

## 2018-03-16 NOTE — BRIEF OPERATIVE NOTE - OPERATION/FINDINGS
Distended gallbladder decompressed laparoscopically. Duct and artery ligated with 5mm surgical clips.

## 2018-03-16 NOTE — DISCHARGE NOTE ADULT - ADDITIONAL INSTRUCTIONS
Please follow up with:    1) Dr. Adame in 7-10 days  2) Urology within 1 week (Call [phone] for an appointment) Please follow up with:    1) Dr. Adame in 7-10 days  2) Urology within 1 week (Call [phone] for an appointment)  3) Your primary care physician in 7-10 days

## 2018-03-16 NOTE — DISCHARGE NOTE ADULT - CARE PLAN
Principal Discharge DX:	Cholecystitis  Goal:	Laparoscopic cholecystectomy  Assessment and plan of treatment:	Resume regular diet at home  Please follow up with Dr. Adame in 7-10 days  Do not remove steri strips. They will fall off on their own.  After showering, please pat steri strips dry. After surgery, some blood may escape from under the tape. The paper tape may fall off after several days. If not, they will be removed in the office.  Secondary Diagnosis:	Kidney stone  Goal:	Pain control  Assessment and plan of treatment:	Continue taking Flomax daily at home.  Drink plenty of fluids.    Follow up within 1 week of discharge (You may call [phone] for an appointment)

## 2018-03-16 NOTE — DISCHARGE NOTE ADULT - CONDITIONS AT DISCHARGE
Alert & oriented. Out of bed ambulating. Tolerating diet without nausea or vomiting. Voiding without difficulty. Abdominal surgical incision with steri strips remains dry and intact. Vitals stable, afebrile. Understands all discharge instruction & will follow up with the MD

## 2018-03-16 NOTE — PROGRESS NOTE ADULT - SUBJECTIVE AND OBJECTIVE BOX
GENERAL SURGERY DAILY PROGRESS NOTE      Subjective:  NAEO. This AM pt continues to endorse some dysuria and R sided back pain. Has been NPO, on antibiotics.         Objective:    PE:  Gen:  Sitting up in bed, appears comfortable  Abdomen:  Soft, minimally tender to palpation in the right upper quadrant, non distended  Msk: R costovertebral tenderness  Extremities:  Warm, well perfused    Vital Signs Last 24 Hrs  T(C): 37.8 (16 Mar 2018 08:09), Max: 37.8 (16 Mar 2018 08:09)  T(F): 100 (16 Mar 2018 08:09), Max: 100 (16 Mar 2018 08:09)  HR: 89 (16 Mar 2018 08:09) (72 - 89)  BP: 133/75 (16 Mar 2018 08:09) (116/72 - 134/74)  BP(mean): --  RR: 16 (16 Mar 2018 08:09) (12 - 18)  SpO2: 97% (16 Mar 2018 08:09) (96% - 100%)    I&O's Detail    15 Mar 2018 07:01  -  16 Mar 2018 07:00  --------------------------------------------------------  IN:    IV PiggyBack: 50 mL    lactated ringers.: 2250 mL    Oral Fluid: 200 mL  Total IN: 2500 mL    OUT:    Voided: 1500 mL  Total OUT: 1500 mL    Total NET: 1000 mL          Daily Height in cm: 185.42 (15 Mar 2018 15:51)    Daily     MEDICATIONS  (STANDING):  cefoTEtan  IVPB      cefoTEtan  IVPB 1 Gram(s) IV Intermittent every 12 hours  heparin  Injectable 5000 Unit(s) SubCutaneous every 8 hours  HYDROmorphone  Injectable 1 milliGRAM(s) IV Push once  influenza   Vaccine 0.5 milliLiter(s) IntraMuscular once  lactated ringers. 1000 milliLiter(s) (125 mL/Hr) IV Continuous <Continuous>  magnesium sulfate  IVPB 2 Gram(s) IV Intermittent once  potassium phosphate IVPB 15 milliMole(s) IV Intermittent once  tamsulosin 0.4 milliGRAM(s) Oral at bedtime    MEDICATIONS  (PRN):  HYDROmorphone  Injectable 1 milliGRAM(s) IV Push every 4 hours PRN Severe Pain (7 - 10)  oxyCODONE    5 mG/acetaminophen 325 mG 1 Tablet(s) Oral every 4 hours PRN Moderate Pain      LABS:                        14.6   12.33 )-----------( 205      ( 16 Mar 2018 06:30 )             41.9     03-16    138  |  100  |  9   ----------------------------<  103<H>  3.8   |  23  |  1.20    Ca    8.5      16 Mar 2018 06:30  Phos  2.5     03-16  Mg     1.6     03-16    TPro  6.4  /  Alb  3.9  /  TBili  0.7  /  DBili  x   /  AST  18  /  ALT  15  /  AlkPhos  64  03-16    PT/INR - ( 16 Mar 2018 06:30 )   PT: 17.0 SEC;   INR: 1.52          PTT - ( 16 Mar 2018 06:30 )  PTT:28.7 SEC  Urinalysis Basic - ( 15 Mar 2018 08:59 )    Color: YELLOW / Appearance: HAZY / S.040 / pH: 6.0  Gluc: NEGATIVE / Ketone: SMALL  / Bili: NEGATIVE / Urobili: NORMAL mg/dL   Blood: LARGE / Protein: 30 mg/dL / Nitrite: NEGATIVE   Leuk Esterase: NEGATIVE / RBC: >50 / WBC 10-25   Sq Epi: x / Non Sq Epi: x / Bacteria: x        RADIOLOGY & ADDITIONAL STUDIES:

## 2018-03-16 NOTE — DISCHARGE NOTE ADULT - HOSPITAL COURSE
32 year old male with no past medical history presents to the emergency room complaining of abdominal pain.  Pain started around midnight last night.  Patient describes a sharp and stabbing pain in the right upper quadrant.  No aggravating or relieving factors.  Patient has never had pain like this in the past.  Patient reports nausea but no vomiting.  No report of any fevers, chills, shortness of breath, dysuria, changes in bowel habits.      Patient was admitted, made NPO, started on IVF. Found of imaging to also have R kidney stone, seen by urology who recommended Flomax and outpt follow up. Patient underwent lap cholecystectomy on 3/16, tolerated well, advanced to regular diet post-operatively.     3/17 - Patient seen and assessed in the AM, found to be in stable condition with abdominal pain improved and back pain improved. Discharged home in stable condition in the PM

## 2018-03-17 VITALS
TEMPERATURE: 98 F | RESPIRATION RATE: 17 BRPM | OXYGEN SATURATION: 98 % | HEART RATE: 88 BPM | DIASTOLIC BLOOD PRESSURE: 80 MMHG | SYSTOLIC BLOOD PRESSURE: 122 MMHG

## 2018-03-17 LAB
BACTERIA UR CULT: SIGNIFICANT CHANGE UP
BUN SERPL-MCNC: 15 MG/DL — SIGNIFICANT CHANGE UP (ref 7–23)
CALCIUM SERPL-MCNC: 8.5 MG/DL — SIGNIFICANT CHANGE UP (ref 8.4–10.5)
CHLORIDE SERPL-SCNC: 100 MMOL/L — SIGNIFICANT CHANGE UP (ref 98–107)
CO2 SERPL-SCNC: 25 MMOL/L — SIGNIFICANT CHANGE UP (ref 22–31)
CREAT SERPL-MCNC: 1.06 MG/DL — SIGNIFICANT CHANGE UP (ref 0.5–1.3)
GLUCOSE SERPL-MCNC: 112 MG/DL — HIGH (ref 70–99)
HCT VFR BLD CALC: 38.3 % — LOW (ref 39–50)
HGB BLD-MCNC: 13.4 G/DL — SIGNIFICANT CHANGE UP (ref 13–17)
MAGNESIUM SERPL-MCNC: 2.3 MG/DL — SIGNIFICANT CHANGE UP (ref 1.6–2.6)
MCHC RBC-ENTMCNC: 31 PG — SIGNIFICANT CHANGE UP (ref 27–34)
MCHC RBC-ENTMCNC: 35 % — SIGNIFICANT CHANGE UP (ref 32–36)
MCV RBC AUTO: 88.7 FL — SIGNIFICANT CHANGE UP (ref 80–100)
NRBC # FLD: 0 — SIGNIFICANT CHANGE UP
PHOSPHATE SERPL-MCNC: 2.7 MG/DL — SIGNIFICANT CHANGE UP (ref 2.5–4.5)
PLATELET # BLD AUTO: 165 K/UL — SIGNIFICANT CHANGE UP (ref 150–400)
PMV BLD: 10.6 FL — SIGNIFICANT CHANGE UP (ref 7–13)
POTASSIUM SERPL-MCNC: 4.1 MMOL/L — SIGNIFICANT CHANGE UP (ref 3.5–5.3)
POTASSIUM SERPL-SCNC: 4.1 MMOL/L — SIGNIFICANT CHANGE UP (ref 3.5–5.3)
RBC # BLD: 4.32 M/UL — SIGNIFICANT CHANGE UP (ref 4.2–5.8)
RBC # FLD: 12.1 % — SIGNIFICANT CHANGE UP (ref 10.3–14.5)
SODIUM SERPL-SCNC: 138 MMOL/L — SIGNIFICANT CHANGE UP (ref 135–145)
SPECIMEN SOURCE: SIGNIFICANT CHANGE UP
SPECIMEN SOURCE: SIGNIFICANT CHANGE UP
WBC # BLD: 14.23 K/UL — HIGH (ref 3.8–10.5)
WBC # FLD AUTO: 14.23 K/UL — HIGH (ref 3.8–10.5)

## 2018-03-17 RX ADMIN — HEPARIN SODIUM 5000 UNIT(S): 5000 INJECTION INTRAVENOUS; SUBCUTANEOUS at 05:31

## 2018-03-17 RX ADMIN — Medication 30 MILLIGRAM(S): at 05:45

## 2018-03-17 RX ADMIN — Medication 30 MILLIGRAM(S): at 05:30

## 2018-03-17 NOTE — PROGRESS NOTE ADULT - SUBJECTIVE AND OBJECTIVE BOX
GENERAL SURGERY DAILY PROGRESS NOTE      Subjective:  Patient underwent lap cholecystectomy yesterday, tolerated procedure well. This AM pt feels well overall, endorsing minimal incisional pain. Denies further back pain or dysuria. Has been voiding spontaneously. Has been OOB        Objective:    PE:  Gen: NAD  Resp: No increased WOB  Abd: soft, mildly incisionally tender, incisions dressed with steri strips c/d/i    Vital Signs Last 24 Hrs  T(C): 37.1 (17 Mar 2018 10:12), Max: 37.2 (16 Mar 2018 13:55)  T(F): 98.7 (17 Mar 2018 10:12), Max: 98.9 (16 Mar 2018 13:55)  HR: 90 (17 Mar 2018 10:12) (77 - 100)  BP: 124/83 (17 Mar 2018 10:12) (115/73 - 127/82)  BP(mean): --  RR: 18 (17 Mar 2018 10:12) (16 - 20)  SpO2: 97% (17 Mar 2018 10:12) (95% - 98%)    I&O's Detail    16 Mar 2018 07:01  -  17 Mar 2018 07:00  --------------------------------------------------------  IN:    IV PiggyBack: 300 mL    lactated ringers.: 250 mL    Oral Fluid: 960 mL  Total IN: 1510 mL    OUT:    Voided: 2950 mL  Total OUT: 2950 mL    Total NET: -1440 mL          Daily     Daily     MEDICATIONS  (STANDING):  heparin  Injectable 5000 Unit(s) SubCutaneous every 8 hours  influenza   Vaccine 0.5 milliLiter(s) IntraMuscular once  tamsulosin 0.4 milliGRAM(s) Oral at bedtime    MEDICATIONS  (PRN):  oxyCODONE    5 mG/acetaminophen 325 mG 2 Tablet(s) Oral every 4 hours PRN Severe Pain (7 - 10)  oxyCODONE    5 mG/acetaminophen 325 mG 1 Tablet(s) Oral every 4 hours PRN Moderate Pain      LABS:                        13.4   14.23 )-----------( 165      ( 17 Mar 2018 07:35 )             38.3     03-17    138  |  100  |  15  ----------------------------<  112<H>  4.1   |  25  |  1.06    Ca    8.5      17 Mar 2018 07:35  Phos  2.7     03-17  Mg     2.3     03-17    TPro  6.4  /  Alb  3.9  /  TBili  0.7  /  DBili  x   /  AST  18  /  ALT  15  /  AlkPhos  64  03-16    PT/INR - ( 16 Mar 2018 06:30 )   PT: 17.0 SEC;   INR: 1.52          PTT - ( 16 Mar 2018 06:30 )  PTT:28.7 SEC      RADIOLOGY & ADDITIONAL STUDIES:

## 2018-03-17 NOTE — PROGRESS NOTE ADULT - SUBJECTIVE AND OBJECTIVE BOX
ANESTHESIA POSTOP CHECK    32y Male POSTOP DAY 1 S/P     Vital Signs Last 24 Hrs  T(C): 37.1 (17 Mar 2018 10:12), Max: 37.2 (16 Mar 2018 13:55)  T(F): 98.7 (17 Mar 2018 10:12), Max: 98.9 (16 Mar 2018 13:55)  HR: 90 (17 Mar 2018 10:12) (77 - 100)  BP: 124/83 (17 Mar 2018 10:12) (109/60 - 127/82)  BP(mean): --  RR: 18 (17 Mar 2018 10:12) (13 - 20)  SpO2: 97% (17 Mar 2018 10:12) (95% - 98%)  I&O's Summary    16 Mar 2018 07:01  -  17 Mar 2018 07:00  --------------------------------------------------------  IN: 1510 mL / OUT: 2950 mL / NET: -1440 mL        [X ] NO APPARENT ANESTHESIA COMPLICATIONS      Comments:

## 2018-03-17 NOTE — PROGRESS NOTE ADULT - ASSESSMENT
32y Male presented with cholecystitis and R kidney stone, now s/p Laparoscopic cholecystectomy, currently hemodynamically stable    - Pain control  - Regular diet  - Continue Flomax per Urology  - DVT ppx  - Dispo: DC home today

## 2018-03-18 LAB
-  AMPICILLIN: SIGNIFICANT CHANGE UP
-  CIPROFLOXACIN: SIGNIFICANT CHANGE UP
-  NITROFURANTOIN: SIGNIFICANT CHANGE UP
-  TETRACYCLINE: SIGNIFICANT CHANGE UP
-  VANCOMYCIN: SIGNIFICANT CHANGE UP
BACTERIA UR CULT: SIGNIFICANT CHANGE UP
METHOD TYPE: SIGNIFICANT CHANGE UP
ORGANISM # SPEC MICROSCOPIC CNT: SIGNIFICANT CHANGE UP
ORGANISM # SPEC MICROSCOPIC CNT: SIGNIFICANT CHANGE UP

## 2018-03-20 LAB
BACTERIA BLD CULT: SIGNIFICANT CHANGE UP
BACTERIA BLD CULT: SIGNIFICANT CHANGE UP

## 2018-03-22 ENCOUNTER — APPOINTMENT (OUTPATIENT)
Dept: TRAUMA SURGERY | Facility: CLINIC | Age: 33
End: 2018-03-22
Payer: COMMERCIAL

## 2018-03-22 VITALS
BODY MASS INDEX: 19.88 KG/M2 | WEIGHT: 150 LBS | HEART RATE: 94 BPM | HEIGHT: 73 IN | TEMPERATURE: 98.1 F | SYSTOLIC BLOOD PRESSURE: 128 MMHG | DIASTOLIC BLOOD PRESSURE: 64 MMHG

## 2018-03-22 PROCEDURE — 99024 POSTOP FOLLOW-UP VISIT: CPT

## 2019-01-03 NOTE — DISCHARGE NOTE ADULT - MEDICATION SUMMARY - MEDICATIONS TO STOP TAKING
I will STOP taking the medications listed below when I get home from the hospital:  None same name as above